# Patient Record
Sex: FEMALE | Race: NATIVE HAWAIIAN OR OTHER PACIFIC ISLANDER | Employment: UNEMPLOYED | URBAN - METROPOLITAN AREA
[De-identification: names, ages, dates, MRNs, and addresses within clinical notes are randomized per-mention and may not be internally consistent; named-entity substitution may affect disease eponyms.]

---

## 2022-01-01 ENCOUNTER — APPOINTMENT (OUTPATIENT)
Dept: GENERAL RADIOLOGY | Age: 87
End: 2022-01-01

## 2022-01-01 ENCOUNTER — HOSPITAL ENCOUNTER (EMERGENCY)
Age: 87
End: 2022-11-14
Attending: EMERGENCY MEDICINE

## 2022-01-01 VITALS — SYSTOLIC BLOOD PRESSURE: 55 MMHG | HEART RATE: 78 BPM | RESPIRATION RATE: 18 BRPM | DIASTOLIC BLOOD PRESSURE: 37 MMHG

## 2022-01-01 DIAGNOSIS — I46.9 CARDIOPULMONARY ARREST (HCC): Primary | ICD-10-CM

## 2022-01-01 LAB
A/G RATIO: 0.5 (ref 1.1–2.2)
ALBUMIN SERPL-MCNC: 1.5 G/DL (ref 3.4–5)
ALP BLD-CCNC: 114 U/L (ref 40–129)
ALT SERPL-CCNC: 66 U/L (ref 10–40)
ANION GAP SERPL CALCULATED.3IONS-SCNC: 20 MMOL/L (ref 3–16)
ANISOCYTOSIS: ABNORMAL
AST SERPL-CCNC: 263 U/L (ref 15–37)
ATYPICAL LYMPHOCYTE RELATIVE PERCENT: 4 % (ref 0–6)
BANDED NEUTROPHILS RELATIVE PERCENT: 20 % (ref 0–7)
BASOPHILS ABSOLUTE: 0 K/UL (ref 0–0.2)
BASOPHILS RELATIVE PERCENT: 0 %
BILIRUB SERPL-MCNC: 2 MG/DL (ref 0–1)
BUN BLDV-MCNC: 24 MG/DL (ref 7–20)
CALCIUM SERPL-MCNC: 7.6 MG/DL (ref 8.3–10.6)
CHLORIDE BLD-SCNC: 101 MMOL/L (ref 99–110)
CO2: 16 MMOL/L (ref 21–32)
CREAT SERPL-MCNC: 1 MG/DL (ref 0.6–1.2)
EOSINOPHILS ABSOLUTE: 0.3 K/UL (ref 0–0.6)
EOSINOPHILS RELATIVE PERCENT: 2 %
GFR SERPL CREATININE-BSD FRML MDRD: 55 ML/MIN/{1.73_M2}
GLUCOSE BLD-MCNC: 137 MG/DL (ref 70–99)
GLUCOSE BLD-MCNC: 86 MG/DL (ref 70–99)
HCT VFR BLD CALC: 34.3 % (ref 36–48)
HEMATOLOGY PATH CONSULT: YES
HEMOGLOBIN: 10.5 G/DL (ref 12–16)
LYMPHOCYTES ABSOLUTE: 1.8 K/UL (ref 1–5.1)
LYMPHOCYTES RELATIVE PERCENT: 10 %
MACROCYTES: ABNORMAL
MCH RBC QN AUTO: 33.9 PG (ref 26–34)
MCHC RBC AUTO-ENTMCNC: 30.6 G/DL (ref 31–36)
MCV RBC AUTO: 110.8 FL (ref 80–100)
METAMYELOCYTES RELATIVE PERCENT: 6 %
MONOCYTES ABSOLUTE: 1.2 K/UL (ref 0–1.3)
MONOCYTES RELATIVE PERCENT: 9 %
MYELOCYTE PERCENT: 1 %
NEUTROPHILS ABSOLUTE: 9.7 K/UL (ref 1.7–7.7)
NEUTROPHILS RELATIVE PERCENT: 48 %
NUCLEATED RED BLOOD CELLS: 3 /100 WBC
PDW BLD-RTO: 19.6 % (ref 12.4–15.4)
PERFORMED ON: NORMAL
PLATELET # BLD: 69 K/UL (ref 135–450)
PLATELET SLIDE REVIEW: ABNORMAL
PMV BLD AUTO: 7.7 FL (ref 5–10.5)
POLYCHROMASIA: ABNORMAL
POTASSIUM REFLEX MAGNESIUM: 5.1 MMOL/L (ref 3.5–5.1)
PRO-BNP: 5889 PG/ML (ref 0–449)
RBC # BLD: 3.09 M/UL (ref 4–5.2)
SCHISTOCYTES: ABNORMAL
SLIDE REVIEW: ABNORMAL
SODIUM BLD-SCNC: 137 MMOL/L (ref 136–145)
TOTAL PROTEIN: 4.4 G/DL (ref 6.4–8.2)
TOXIC GRANULATION: PRESENT
TROPONIN: 0.08 NG/ML
WBC # BLD: 12.9 K/UL (ref 4–11)

## 2022-01-01 PROCEDURE — 80053 COMPREHEN METABOLIC PANEL: CPT

## 2022-01-01 PROCEDURE — 85025 COMPLETE CBC W/AUTO DIFF WBC: CPT

## 2022-01-01 PROCEDURE — 2580000003 HC RX 258: Performed by: EMERGENCY MEDICINE

## 2022-01-01 PROCEDURE — 84484 ASSAY OF TROPONIN QUANT: CPT

## 2022-01-01 PROCEDURE — 99284 EMERGENCY DEPT VISIT MOD MDM: CPT

## 2022-01-01 PROCEDURE — 31500 INSERT EMERGENCY AIRWAY: CPT

## 2022-01-01 PROCEDURE — 83880 ASSAY OF NATRIURETIC PEPTIDE: CPT

## 2022-01-01 PROCEDURE — 36556 INSERT NON-TUNNEL CV CATH: CPT

## 2022-01-01 PROCEDURE — 93005 ELECTROCARDIOGRAM TRACING: CPT | Performed by: EMERGENCY MEDICINE

## 2022-01-01 PROCEDURE — 6360000002 HC RX W HCPCS: Performed by: EMERGENCY MEDICINE

## 2022-01-01 PROCEDURE — 36415 COLL VENOUS BLD VENIPUNCTURE: CPT

## 2022-01-01 PROCEDURE — 92950 HEART/LUNG RESUSCITATION CPR: CPT

## 2022-01-01 PROCEDURE — 94002 VENT MGMT INPAT INIT DAY: CPT

## 2022-01-01 RX ORDER — DIAZEPAM 5 MG/ML
5 INJECTION, SOLUTION INTRAMUSCULAR; INTRAVENOUS
Status: DISCONTINUED | OUTPATIENT
Start: 2022-01-01 | End: 2022-01-01 | Stop reason: HOSPADM

## 2022-01-01 RX ORDER — MORPHINE SULFATE 4 MG/ML
4 INJECTION, SOLUTION INTRAMUSCULAR; INTRAVENOUS ONCE
Status: COMPLETED | OUTPATIENT
Start: 2022-01-01 | End: 2022-01-01

## 2022-01-01 RX ADMIN — MORPHINE SULFATE 4 MG: 4 INJECTION, SOLUTION INTRAMUSCULAR; INTRAVENOUS at 17:25

## 2022-01-01 RX ADMIN — EPINEPHRINE 12 MCG/MIN: 1 INJECTION PARENTERAL at 16:18

## 2022-01-01 ASSESSMENT — PULMONARY FUNCTION TESTS: PIF_VALUE: 39

## 2022-11-14 NOTE — ED NOTES
Per Dr. Belen Sarmiento DNR comfort care established per daughter and family. Family at bedside at this time.       Tomas Almeida RN  11/14/22 Dread Ji RN  11/14/22 5772       Tomas Almeida RN  11/14/22 0609

## 2022-11-14 NOTE — ED NOTES
Pulse check at this time with no signs of ROSC. Monitor showed signs of PEA. CPR continued.       Chelsey Coleman RN  11/14/22 5591

## 2022-11-14 NOTE — ED NOTES
Per Dr. Leanne Fleischer levo stopped and ETT removed per Dr. Leanne Fleischer and daughter and patients family.       Devora Luna RN  11/14/22 2812

## 2022-11-14 NOTE — ED NOTES
Pt noted asystole at 1739 on the montior. No pulse felt. Dr. Jaki Hickamn aware.       Flavio Sarmiento, KLAUDIA  11/14/22 1744       Flavio Sarmiento RN  11/14/22 1745       Flavio Sarmiento RN  11/14/22 1745       Flavio Sarmiento RN  11/14/22 1747

## 2022-11-14 NOTE — ED NOTES
D50 given at this time per verbal order of Dr. Ynes Wadsworth.       Richar Parker, KLAUDIA  11/14/22 7720

## 2022-11-15 LAB
EKG ATRIAL RATE: 97 BPM
EKG DIAGNOSIS: NORMAL
EKG P AXIS: 25 DEGREES
EKG P-R INTERVAL: 204 MS
EKG Q-T INTERVAL: 388 MS
EKG QRS DURATION: 98 MS
EKG QTC CALCULATION (BAZETT): 492 MS
EKG R AXIS: -56 DEGREES
EKG T AXIS: 65 DEGREES
EKG VENTRICULAR RATE: 97 BPM

## 2022-11-15 NOTE — ED PROVIDER NOTES
EMERGENCY DEPARTMENT PROVIDER NOTE    Patient Identification  Pt Name: Isaac Bautista  MRN: [id number]  [place] 1935  Date of evaluation: 11/14/2022  Provider: Dunia Quiñonez DO  PCP: No primary care provider on file. Chief Complaint  Code (Pt comes in from home via [facility] d/t being found down lying down. EMS states she was found down for 15 minutes prior to EMS arrival. EMS states CPR for 45 minutes, had ROSC at 25 mins in of CPR then lost pulse. Chelsea Chroman used upon ER arrival. 4 epi given by EMS and 1 of bicarb en route. IO in left humerus by EMS )      HPI  (History provided by EMS)  This is a 81 y.o. female with pertinent past medical history of CVA, bedbound state who was brought in by EMS transportation for cardiac arrest.  Patient arrives unresponsive with CPR in progress, no other historians are immediately available. Per EMS patient was found by family lying in bed unresponsive, she was potentially last seen well about 15 minutes beforehand. Unclear if patient received any bystander CPR. On EMS arrival patient was in PEA and CPR initiated. After about 25 minutes of CPR, EMS reported they briefly obtained ROSC for a few minutes before patient again arrested. On arrival to the emergency department the patient is in PEA. Total time of CPR prior to arrival is about 45 minutes. ROS    Unable to obtain due to critical illness    I have reviewed the following nursing documentation:  Allergies: Patient has no allergy information on record. Past medical history: No past medical history on file. Past surgical history: No past surgical history on file. Home medications: There are no discharge medications for this patient. Social history:      Family history:  No family history on file.       Exam  ED Triage Vitals   BP Temp Temp src Heart Rate Resp SpO2 Height Weight   11/14/22 1619 -- -- 11/14/22 1609 11/14/22 1619 -- -- --   (!) 48/33   93 18        Nursing note and vitals reviewed. Constitutional: Well developed, well nourished. Critically ill-appearing, unresponsive with CPR in progress. Anasarca. HENT:      Head: Normocephalic and atraumatic. Ears: External ears normal.      Nose: Nose normal.     Mouth: Membrane mucosa moist and pink. Copious bloody upper airway secretions noted  Eyes: Anicteric sclera. No discharge. Pupils midrange and fixed. Neck: Supple. Trachea midline. Cardiovascular: No heart tones auscultated, extremities cool to touch. 4+ pitting symmetric lower extremity edema. Pulmonary/Chest: Apneic, ventilation in process via Ambu bag   abdominal: Mildly firm, distended. Musculoskeletal: Moves all extremities. No gross deformity. Neurological: Unresponsive. No corneal or gag reflex. Upper extremities contracted. Skin: Cool and dry. No rash. Procedures    Rapid Sequence Intubation  Date: 11/14/22  Indication: Cardiopulmonary arrest  Consent: Emergent    A time-out was completed verifying correct patient, procedure and positioning. The patient was placed in a flat position. Patient unresponsive without gag reflex, no RSI medications employed. The patient was easily ventilated using an ambu bag. The Glidescope was used and inserted into the oropharynx at which time there was a Grade 1 view of the vocal cords. Copious bloody airway secretions were suctioned. A 7.5-Lebanese endotracheal tube was inserted and visualized going through the vocal cords. The stylette was removed. Colorimetric change was visualized on the CO2 meter. Breath sounds were heard in both lung fields equally. The endotracheal tube was secured at 22cm, measured at the teeth. A chest x-ray was ordered to assess for pneumothorax and verify endotracheal tube placement.     Estimated Blood Loss: none    The patient tolerated the procedure well and there were no complications      Central Venous Catheter (CVC, Central Line) Placement  Date: 11/14/22  Indication: Need for intravenous access and centrally administered medications  Consent: emergent    's hands were cleansed with alcohol based solution. A face mask and surgical cap were worn. Sterile gown and gloves were worn. A time-out was completed verifying correct patient, procedure, site, and positioning. The patient was placed in a dependent position appropriate for central line placement based on the vein to be cannulated. The patients right groin was prepped with chlorhexidine and draped in sterile fashion. 1% Lidocaine was used to anesthetize the surrounding skin area. A triple lumen 7-Thai catheter was introduced into the the common femoral vein using the Seldinger technique and under ultrasound guidance with sterile probe cover. The catheter was threaded smoothly over the guide wire and appropriate blood return was obtained. Each lumen of the catheter was evacuated of air and flushed with sterile saline. The catheter was then sutured in place to the skin and a sterile dressing applied. Perfusion to the extremity distal to the point of catheter insertion was checked and found to be adequate. Estimated Blood Loss: <2cc    The patient tolerated the procedure well and there were no complications. Cardiopulmonary resuscitation  Date: 11/14/22  Indication: Cardiac arrest  Consent: emergent    Patient arrived in PEA with CPR in progress by EMS. CPR was continued with high-quality chest compressions via [place] device according to ACLS guidelines. Patient received additional epinephrine at 5-minute intervals as well as bicarbonate and D50. Patient remained in PEA during all rhythm checks and without any identified shockable rhythms. ROSC was obtained with organized rhythm on telemetry and palpable femoral pulse after 15 minutes of CPR in the emergency department.     Total time of CPR in prehospital setting: Approximately 45 minutes  Total time of CPR I personally supervised: 15 minutes  Total CPR time is 60 minutes prior to ROSC        EKG    EKG was reviewed by emergency department physician in the absence of a cardiologist    Wide complex sinus rhythm, rate 97, left axis deviation, normal MO and wide QRS intervals, borderline prolonged Qtc, no ST elevations or depressions, TWI V2, impression wide-complex sinus rhythm with nonspecific T wave morphology, RBBB, no STEMI, no comparison available      Radiology  No orders to display       Labs  Results for orders placed or performed during the hospital encounter of 11/14/22   CBC with Auto Differential   Result Value Ref Range    WBC 12.9 (H) 4.0 - 11.0 K/uL    RBC 3.09 (L) 4.00 - 5.20 M/uL    Hemoglobin 10.5 (L) 12.0 - 16.0 g/dL    Hematocrit 34.3 (L) 36.0 - 48.0 %    .8 (H) 80.0 - 100.0 fL    MCH 33.9 26.0 - 34.0 pg    MCHC 30.6 (L) 31.0 - 36.0 g/dL    RDW 19.6 (H) 12.4 - 15.4 %    Platelets 69 (L) 283 - 450 K/uL    MPV 7.7 5.0 - 10.5 fL    PLATELET SLIDE REVIEW Decreased     SLIDE REVIEW see below     Path Consult Yes     Neutrophils % 48.0 %    Lymphocytes % 10.0 %    Monocytes % 9.0 %    Eosinophils % 2.0 %    Basophils % 0.0 %    Neutrophils Absolute 9.7 (H) 1.7 - 7.7 K/uL    Lymphocytes Absolute 1.8 1.0 - 5.1 K/uL    Monocytes Absolute 1.2 0.0 - 1.3 K/uL    Eosinophils Absolute 0.3 0.0 - 0.6 K/uL    Basophils Absolute 0.0 0.0 - 0.2 K/uL    Bands Relative 20 (H) 0 - 7 %    Atypical Lymphocytes Relative 4 0 - 6 %    Metamyelocytes Relative 6 (A) %    Myelocyte Percent 1 (A) %    nRBC 3 (A) /100 WBC    Toxic Granulation Present (A)     Anisocytosis 1+ (A)     Macrocytes 1+ (A)     Polychromasia Occasional (A)     Schistocytes Occasional (A)    CMP w/ Reflex to MG   Result Value Ref Range    Sodium 137 136 - 145 mmol/L    Potassium reflex Magnesium 5.1 3.5 - 5.1 mmol/L    Chloride 101 99 - 110 mmol/L    CO2 16 (L) 21 - 32 mmol/L    Anion Gap 20 (H) 3 - 16    Glucose 137 (H) 70 - 99 mg/dL    BUN 24 (H) 7 - 20 mg/dL    Creatinine 1.0 0.6 - 1.2 mg/dL    Est, Glom Filt Rate 55 (A) >60    Calcium 7.6 (L) 8.3 - 10.6 mg/dL    Total Protein 4.4 (L) 6.4 - 8.2 g/dL    Albumin 1.5 (L) 3.4 - 5.0 g/dL    Albumin/Globulin Ratio 0.5 (L) 1.1 - 2.2    Total Bilirubin 2.0 (H) 0.0 - 1.0 mg/dL    Alkaline Phosphatase 114 40 - 129 U/L    ALT 66 (H) 10 - 40 U/L     (H) 15 - 37 U/L   Troponin   Result Value Ref Range    Troponin 0.08 (H) <0.01 ng/mL   Brain Natriuretic Peptide   Result Value Ref Range    Pro-BNP 5,889 (H) 0 - 449 pg/mL   POCT Glucose   Result Value Ref Range    POC Glucose 86 70 - 99 mg/dl    Performed on [id number]    EKG 12 Lead   Result Value Ref Range    Ventricular Rate 97 BPM    Atrial Rate 97 BPM    P-R Interval 204 ms    QRS Duration 98 ms    Q-T Interval 388 ms    QTc Calculation (Bazett) 492 ms    P Axis 25 degrees    R Axis -56 degrees    T Axis 65 degrees    Diagnosis       Normal sinus rhythmLeft axis deviationPulmonary disease patternIncomplete right bundle branch blockST & T wave abnormality, consider anterior ischemiaProlonged QTAbnormal ECG       Screenings   Gerard Coma Scale  OPO Notified: Yes  Date OPO Notified: 22  Time OPO Notified: 6361 (called [facility]. was told to call back with time of death. no referral number given.)  OPO Referral Number: [id number]  OPO Accepted: No         MDM and ED Course    Patient arrived critically ill-appearing with CPR in progress. PEA on arrival to the emergency department. Total time of CPR prehospital setting about 45 minutes, very poor prognosis. Did secure definitive airway and continued CPR, ROSC was then obtained. Patient remained unresponsive with absent cough and gag reflexes despite lack of sedation. EKG no STEMI. Patient was initiated on epinephrine infusion for severe hypotension. I have lengthy discussion with patient's daughter as well as other family members regarding plan of care. Daughter reports patient spouse is  and that she is the primary caregiver for her mother.   I discussed my concerns that patient has remained unresponsive after ROSC and prognosis is very grim, my clinical suspicion for anoxic brain injury is very high. Daughter and other family numbers are all in agreement to not continue aggressive measures, they wish to continue support patient's breathing on mechanical ventilation and vasopressor until they are given a chance to say their goodbyes, after which family requested withdrawal of care. I discussed that without continued vasopressor support and mechanical ventilation patient would almost certainly and rapidly proceed to a natural death and family verbalized their understanding. Family did then request patient be terminally extubated and this was performed. Patient rapidly proceeded to asystole. Time of death 46. I offered the family my sincere condolences and assured them that I would be available to them should they have any need to speak with me further for any reason. I Dr. Peggy Castillo am the primary clinician of record. Critical Care Time    Upon my evaluation, this patient had a high probability of imminent or life-threatening deterioration due to cardiopulmonary arrest which required my direct attention, intervention, and personal management. Extensive discussion was held with family members regarding direct patient care and determining CODE STATUS. The total critical care time personally spent while evaluating and treating this patient was 50 minutes exclusive of any time spent doing separately billable procedures. This includes time at the bedside, data interpretation, medication management, monitoring for potential decompensation and physician consultation. Specifics of interventions taken and potentially life-threatening diagnostic considerations are listed above in the medical decision making. Final Impression  1. Cardiopulmonary arrest (HCC)        Blood pressure (!) 55/37, pulse 78, resp. rate 18.      Disposition:  DISPOSITION  2022 05:44:28 PM      Patient Referrals:  No follow-up provider specified. Discharge Medications: There are no discharge medications for this patient. Discontinued Medications: There are no discharge medications for this patient. This chart was generated using the [address] [place] [address]   dictation system. I created this record but it may contain dictation errors given the limitations of this technology.     Darrel Lima DO (electronically signed)  Attending Emergency Physician         Darrel Lima DO  11/15/22 5421

## 2022-11-16 LAB — HEMATOLOGY PATH CONSULT: NORMAL
